# Patient Record
Sex: MALE | Race: BLACK OR AFRICAN AMERICAN | NOT HISPANIC OR LATINO | Employment: OTHER | URBAN - METROPOLITAN AREA
[De-identification: names, ages, dates, MRNs, and addresses within clinical notes are randomized per-mention and may not be internally consistent; named-entity substitution may affect disease eponyms.]

---

## 2023-09-19 PROBLEM — F31.9 BIPOLAR DISORDER (MULTI): Status: ACTIVE | Noted: 2023-09-19

## 2023-09-19 PROBLEM — L81.9 PIGMENTED SKIN LESION: Status: ACTIVE | Noted: 2023-09-19

## 2023-09-19 PROBLEM — E78.5 HYPERLIPIDEMIA: Status: ACTIVE | Noted: 2023-09-19

## 2023-09-19 PROBLEM — I42.9 CARDIOMYOPATHY (MULTI): Status: ACTIVE | Noted: 2023-09-19

## 2023-09-19 PROBLEM — E11.9 DIABETES MELLITUS WITHOUT COMPLICATION (MULTI): Status: ACTIVE | Noted: 2023-09-19

## 2023-09-19 PROBLEM — K56.3 GALLSTONE ILEUS (MULTI): Status: ACTIVE | Noted: 2023-09-19

## 2023-09-19 PROBLEM — I83.019: Status: ACTIVE | Noted: 2023-09-19

## 2023-09-19 PROBLEM — L97.509 ULCER OF FOOT DUE TO TYPE 2 DIABETES MELLITUS (MULTI): Status: ACTIVE | Noted: 2023-09-19

## 2023-09-19 PROBLEM — E04.1 THYROID NODULE: Status: ACTIVE | Noted: 2023-09-19

## 2023-09-19 PROBLEM — R06.2 WHEEZING: Status: ACTIVE | Noted: 2023-09-19

## 2023-09-19 PROBLEM — E11.621 ULCER OF FOOT DUE TO TYPE 2 DIABETES MELLITUS (MULTI): Status: ACTIVE | Noted: 2023-09-19

## 2023-09-19 PROBLEM — L98.499 TYPE 2 DIABETES MELLITUS WITH OTHER SKIN ULCER (MULTI): Status: ACTIVE | Noted: 2023-09-19

## 2023-09-19 PROBLEM — E87.5 HYPERKALEMIA: Status: ACTIVE | Noted: 2023-09-19

## 2023-09-19 PROBLEM — I50.9 CONGESTIVE HEART FAILURE (MULTI): Status: ACTIVE | Noted: 2023-09-19

## 2023-09-19 PROBLEM — N18.30 STAGE 3 CHRONIC KIDNEY DISEASE (MULTI): Status: ACTIVE | Noted: 2023-09-19

## 2023-09-19 PROBLEM — R06.9 ABNORMAL BREATHING: Status: ACTIVE | Noted: 2023-09-19

## 2023-09-19 PROBLEM — E66.01 MORBID OBESITY (MULTI): Status: ACTIVE | Noted: 2023-09-19

## 2023-09-19 PROBLEM — K21.9 GASTROESOPHAGEAL REFLUX DISEASE WITHOUT ESOPHAGITIS: Status: ACTIVE | Noted: 2023-09-19

## 2023-09-19 PROBLEM — N52.9 IMPOTENCE OF ORGANIC ORIGIN: Status: ACTIVE | Noted: 2023-09-19

## 2023-09-19 PROBLEM — J96.11 CHRONIC RESPIRATORY FAILURE WITH HYPOXIA (MULTI): Status: ACTIVE | Noted: 2023-09-19

## 2023-09-19 PROBLEM — H90.6 MIXED CONDUCTIVE AND SENSORINEURAL HEARING LOSS OF BOTH EARS: Status: ACTIVE | Noted: 2023-09-19

## 2023-09-19 PROBLEM — I10 ESSENTIAL HYPERTENSION: Status: ACTIVE | Noted: 2023-09-19

## 2023-09-19 PROBLEM — I50.42 CHRONIC COMBINED SYSTOLIC AND DIASTOLIC HEART FAILURE (MULTI): Status: ACTIVE | Noted: 2023-09-19

## 2023-09-19 PROBLEM — I87.2 VENOUS INSUFFICIENCY: Status: ACTIVE | Noted: 2023-09-19

## 2023-09-19 PROBLEM — B02.29 POSTHERPETIC NEURALGIA: Status: ACTIVE | Noted: 2023-09-19

## 2023-09-19 PROBLEM — L97.909 CHRONIC ULCER OF LOWER EXTREMITY (MULTI): Status: ACTIVE | Noted: 2023-09-19

## 2023-09-19 PROBLEM — M10.9 GOUT: Status: ACTIVE | Noted: 2023-09-19

## 2023-09-19 PROBLEM — E11.622 TYPE 2 DIABETES MELLITUS WITH OTHER SKIN ULCER (MULTI): Status: ACTIVE | Noted: 2023-09-19

## 2023-09-19 PROBLEM — D50.0 ANEMIA DUE TO BLOOD LOSS: Status: ACTIVE | Noted: 2023-09-19

## 2023-09-19 PROBLEM — L97.911 ULCER OF RIGHT LOWER EXTREMITY, LIMITED TO BREAKDOWN OF SKIN (MULTI): Status: ACTIVE | Noted: 2023-09-19

## 2023-09-19 PROBLEM — L97.919: Status: ACTIVE | Noted: 2023-09-19

## 2023-09-19 PROBLEM — F20.9 SCHIZOPHRENIA (MULTI): Status: ACTIVE | Noted: 2023-09-19

## 2023-09-19 PROBLEM — N40.0 ENLARGED PROSTATE: Status: ACTIVE | Noted: 2023-09-19

## 2023-09-19 PROBLEM — G47.33 OBSTRUCTIVE SLEEP APNEA SYNDROME: Status: ACTIVE | Noted: 2023-09-19

## 2023-09-19 PROBLEM — E87.8 FLUID VOLUME DISORDER: Status: ACTIVE | Noted: 2023-09-19

## 2023-09-19 PROBLEM — J98.8 CONGESTION OF RESPIRATORY TRACT: Status: ACTIVE | Noted: 2023-09-19

## 2023-09-19 RX ORDER — GABAPENTIN 300 MG/1
300 CAPSULE ORAL NIGHTLY
COMMUNITY

## 2023-09-19 RX ORDER — WARFARIN 1 MG/1
TABLET ORAL
COMMUNITY

## 2023-09-19 RX ORDER — FUROSEMIDE 80 MG/1
80 TABLET ORAL 2 TIMES DAILY
COMMUNITY
End: 2024-02-29 | Stop reason: ALTCHOICE

## 2023-09-19 RX ORDER — SPIRONOLACTONE 25 MG/1
TABLET ORAL
COMMUNITY

## 2023-09-19 RX ORDER — CARVEDILOL 3.12 MG/1
TABLET ORAL
COMMUNITY
End: 2024-02-29 | Stop reason: ALTCHOICE

## 2023-09-19 RX ORDER — PRAVASTATIN SODIUM 80 MG/1
TABLET ORAL
COMMUNITY

## 2023-09-19 RX ORDER — ALLOPURINOL 100 MG/1
TABLET ORAL
COMMUNITY
Start: 2017-12-07

## 2023-09-19 RX ORDER — TORSEMIDE 20 MG/1
TABLET ORAL
COMMUNITY

## 2023-09-19 RX ORDER — TOPIRAMATE 25 MG/1
TABLET ORAL
COMMUNITY

## 2023-09-19 RX ORDER — AMPICILLIN AND SULBACTAM 2; 1 G/1; G/1
INJECTION, POWDER, FOR SOLUTION INTRAMUSCULAR; INTRAVENOUS
COMMUNITY
End: 2024-02-29 | Stop reason: ALTCHOICE

## 2023-09-19 RX ORDER — NAPROXEN SODIUM 220 MG/1
TABLET, FILM COATED ORAL
COMMUNITY

## 2023-09-19 RX ORDER — PANTOPRAZOLE SODIUM 20 MG/1
TABLET, DELAYED RELEASE ORAL
COMMUNITY
End: 2024-02-29 | Stop reason: ALTCHOICE

## 2023-09-19 RX ORDER — WARFARIN SODIUM 5 MG/1
TABLET ORAL
COMMUNITY

## 2023-09-19 RX ORDER — FOLIC ACID 1 MG/1
TABLET ORAL
COMMUNITY
End: 2024-01-11

## 2023-09-19 RX ORDER — COLCHICINE 0.6 MG/1
TABLET ORAL
COMMUNITY
Start: 2011-09-07

## 2023-09-19 RX ORDER — PANTOPRAZOLE SODIUM 40 MG/1
40 TABLET, DELAYED RELEASE ORAL
COMMUNITY
End: 2024-04-12 | Stop reason: SDUPTHER

## 2023-09-19 RX ORDER — PRAVASTATIN SODIUM 20 MG/1
TABLET ORAL
COMMUNITY
End: 2024-02-29 | Stop reason: ALTCHOICE

## 2023-09-19 RX ORDER — FINASTERIDE 5 MG/1
TABLET, FILM COATED ORAL
COMMUNITY
End: 2024-02-29 | Stop reason: ALTCHOICE

## 2023-09-19 RX ORDER — GUAIFENESIN 600 MG/1
TABLET, EXTENDED RELEASE ORAL
COMMUNITY
Start: 2017-12-04 | End: 2024-02-29 | Stop reason: ALTCHOICE

## 2023-09-19 RX ORDER — OXYCODONE AND ACETAMINOPHEN 5; 325 MG/1; MG/1
TABLET ORAL
COMMUNITY
Start: 2022-10-14 | End: 2024-02-29 | Stop reason: ALTCHOICE

## 2023-09-19 RX ORDER — ENALAPRIL MALEATE 2.5 MG/1
TABLET ORAL
COMMUNITY
End: 2024-02-29 | Stop reason: ALTCHOICE

## 2023-09-19 RX ORDER — HYDRALAZINE HYDROCHLORIDE 50 MG/1
1 TABLET, FILM COATED ORAL 3 TIMES DAILY
COMMUNITY

## 2023-10-15 DIAGNOSIS — R73.9 HYPERGLYCEMIA, UNSPECIFIED: ICD-10-CM

## 2023-11-07 RX ORDER — FOLIC ACID 1 MG/1
1 TABLET ORAL DAILY
Qty: 90 TABLET | Refills: 3 | OUTPATIENT
Start: 2023-11-07

## 2023-12-15 ENCOUNTER — TELEPHONE (OUTPATIENT)
Dept: PHARMACY | Facility: HOSPITAL | Age: 63
End: 2023-12-15

## 2023-12-15 NOTE — TELEPHONE ENCOUNTER
Population Health: Outreach by Ambulatory Pharmacy Team    Payor: Solomon FRANKLIN  Reason: Adherence  Medication: Mounjaro 2.5 mg/0.5 mL pen injection, pravastatin 80 mg  Outcome: No Answer/Invalid Number Unable to leave VM (mailbox full).   Additional Notes:   -Note for bariatric surgery, [facility] 11/17/2023- indicated potential switch to Ozempic (cost issue with Mounjaro)   -No indication that pravastatin was discontinued. Patient due to have refilled.     Lashonda ShannonD, [facility] PGY1 Pharmacy Resident  [facility]

## 2024-02-29 ENCOUNTER — OFFICE VISIT (OUTPATIENT)
Dept: PRIMARY CARE | Facility: CLINIC | Age: 64
End: 2024-02-29
Payer: MEDICARE

## 2024-02-29 VITALS
WEIGHT: 273 LBS | RESPIRATION RATE: 16 BRPM | HEIGHT: 70 IN | BODY MASS INDEX: 39.08 KG/M2 | HEART RATE: 80 BPM | OXYGEN SATURATION: 96 %

## 2024-02-29 DIAGNOSIS — M10.9 GOUTY ARTHROPATHY: ICD-10-CM

## 2024-02-29 DIAGNOSIS — E11.9 DIABETES MELLITUS WITHOUT COMPLICATION (MULTI): ICD-10-CM

## 2024-02-29 DIAGNOSIS — Z00.00 ROUTINE GENERAL MEDICAL EXAMINATION AT A HEALTH CARE FACILITY: Primary | ICD-10-CM

## 2024-02-29 DIAGNOSIS — G47.33 OBSTRUCTIVE SLEEP APNEA SYNDROME: ICD-10-CM

## 2024-02-29 DIAGNOSIS — K21.9 GASTROESOPHAGEAL REFLUX DISEASE WITHOUT ESOPHAGITIS: ICD-10-CM

## 2024-02-29 DIAGNOSIS — M10.9 ACUTE GOUT, UNSPECIFIED CAUSE, UNSPECIFIED SITE: ICD-10-CM

## 2024-02-29 DIAGNOSIS — I50.42 CHRONIC COMBINED SYSTOLIC AND DIASTOLIC HEART FAILURE (MULTI): ICD-10-CM

## 2024-02-29 PROCEDURE — 99214 OFFICE O/P EST MOD 30 MIN: CPT | Performed by: INTERNAL MEDICINE

## 2024-02-29 PROCEDURE — G0439 PPPS, SUBSEQ VISIT: HCPCS | Performed by: INTERNAL MEDICINE

## 2024-02-29 PROCEDURE — 1036F TOBACCO NON-USER: CPT | Performed by: INTERNAL MEDICINE

## 2024-02-29 PROCEDURE — 99215 OFFICE O/P EST HI 40 MIN: CPT | Performed by: INTERNAL MEDICINE

## 2024-02-29 RX ORDER — SUCRALFATE 1 G/1
1 TABLET ORAL
COMMUNITY
Start: 2023-06-09

## 2024-02-29 RX ORDER — ASCORBIC ACID 500 MG
1 TABLET ORAL 2 TIMES DAILY
COMMUNITY
Start: 2018-05-14

## 2024-02-29 RX ORDER — POLYETHYLENE GLYCOL 3350 17 G/17G
1 POWDER, FOR SOLUTION ORAL DAILY
COMMUNITY
Start: 2018-05-29

## 2024-02-29 RX ORDER — PANTOPRAZOLE SODIUM 40 MG/1
40 TABLET, DELAYED RELEASE ORAL
Qty: 180 TABLET | Refills: 3 | Status: CANCELLED | OUTPATIENT
Start: 2024-02-29 | End: 2025-02-28

## 2024-02-29 RX ORDER — DOXYCYCLINE 100 MG/1
100 CAPSULE ORAL 2 TIMES DAILY
Qty: 180 CAPSULE | Refills: 3 | Status: SHIPPED | OUTPATIENT
Start: 2024-02-29 | End: 2024-04-15 | Stop reason: ALTCHOICE

## 2024-02-29 RX ORDER — POTASSIUM CHLORIDE 20 MEQ/1
1 TABLET, EXTENDED RELEASE ORAL DAILY
COMMUNITY
Start: 2022-08-26 | End: 2024-04-17 | Stop reason: SDUPTHER

## 2024-02-29 RX ORDER — PANTOPRAZOLE SODIUM 40 MG/1
40 TABLET, DELAYED RELEASE ORAL DAILY
Qty: 90 TABLET | Refills: 3 | Status: SHIPPED | OUTPATIENT
Start: 2024-02-29 | End: 2024-04-15 | Stop reason: WASHOUT

## 2024-02-29 RX ORDER — VIT C/E/ZN/COPPR/LUTEIN/ZEAXAN 250MG-90MG
2 CAPSULE ORAL DAILY
COMMUNITY
Start: 2018-09-04

## 2024-02-29 RX ORDER — COLCHICINE 0.6 MG/1
0.6 TABLET ORAL 2 TIMES DAILY
Qty: 10 TABLET | Refills: 3 | Status: SHIPPED | OUTPATIENT
Start: 2024-02-29 | End: 2024-03-05

## 2024-02-29 RX ORDER — FERROUS SULFATE 325(65) MG
1 TABLET ORAL
COMMUNITY
Start: 2023-01-05 | End: 2024-05-07 | Stop reason: SDUPTHER

## 2024-02-29 RX ORDER — DOXYCYCLINE 100 MG/1
1 CAPSULE ORAL 2 TIMES DAILY
COMMUNITY
Start: 2024-02-14 | End: 2024-02-29 | Stop reason: SDUPTHER

## 2024-02-29 RX ORDER — COLCHICINE 0.6 MG/1
TABLET ORAL
Qty: 90 TABLET | Refills: 0 | Status: CANCELLED | OUTPATIENT
Start: 2024-02-29

## 2024-02-29 ASSESSMENT — PROMIS GLOBAL HEALTH SCALE
RATE_SOCIAL_SATISFACTION: FAIR
CARRYOUT_PHYSICAL_ACTIVITIES: A LITTLE
RATE_PHYSICAL_HEALTH: FAIR
CARRYOUT_SOCIAL_ACTIVITIES: FAIR
EMOTIONAL_PROBLEMS: SOMETIMES
RATE_GENERAL_HEALTH: POOR
RATE_QUALITY_OF_LIFE: FAIR
RATE_AVERAGE_PAIN: 6
RATE_AVERAGE_FATIGUE: MODERATE
RATE_MENTAL_HEALTH: GOOD

## 2024-02-29 ASSESSMENT — ENCOUNTER SYMPTOMS
HEADACHES: 0
COUGH: 0
ABDOMINAL PAIN: 0
PALPITATIONS: 0
LOSS OF SENSATION IN FEET: 0
CONSTIPATION: 0
FEVER: 0
OCCASIONAL FEELINGS OF UNSTEADINESS: 0
SLEEP DISTURBANCE: 0
FATIGUE: 0
SORE THROAT: 0
JOINT SWELLING: 0
SHORTNESS OF BREATH: 0
NAUSEA: 0
FREQUENCY: 0
APPETITE CHANGE: 0
DIZZINESS: 0
DIFFICULTY URINATING: 0
HEMATURIA: 0
RHINORRHEA: 0
SINUS PAIN: 0
DIARRHEA: 0
VOMITING: 0
DEPRESSION: 0
NERVOUS/ANXIOUS: 0
ARTHRALGIAS: 0
CHILLS: 0
WEAKNESS: 0

## 2024-02-29 ASSESSMENT — PAIN SCALES - GENERAL: PAINLEVEL: 7

## 2024-02-29 ASSESSMENT — PATIENT HEALTH QUESTIONNAIRE - PHQ9
SUM OF ALL RESPONSES TO PHQ9 QUESTIONS 1 AND 2: 0
2. FEELING DOWN, DEPRESSED OR HOPELESS: NOT AT ALL
1. LITTLE INTEREST OR PLEASURE IN DOING THINGS: NOT AT ALL

## 2024-02-29 NOTE — PROGRESS NOTES
"Subjective   Patient ID: Jacob Whipple is a 63 y.o. male who presents for routine medical exam. Patient reports gout attack in his R hand that began about 2 days ago. His hand is swollen and painful. He does not have colchicine and denies recent gout attacks prior to this. He does not drink much water due to his heart and takes torsemide. He was discharged from hospital on 2/2 after he presented for GI bleed. Patient received blood transfusions and IV iron. He was also put back on Coumadin. Has not see GI since his discharge. He now takes iron supplements. Tolerates Ozempic and has lost a lot of weight since his last visit. His blood glucose this morning was 109. Patient has LVAD and was not a candidate for transplant. He changes his dressing daily and follows with LVAD doctor. Uses CPAP nightly. Received both doses of shingrix.    Diagnostics Reviewed: 1/2024 enteroscopy nml.  Labs Reviewed: 2/26/2024 GFR 55, hemoglobin 9.4. 1/22/2024 hemoglobin 5.6. 1/23/2024 iron 27. 9/2023 PSA nml, lipid nml, A1c 5.2         Review of Systems   Constitutional:  Negative for appetite change, chills, fatigue and fever.        Weight loss   HENT:  Negative for ear pain, rhinorrhea, sinus pain and sore throat.    Eyes:  Negative for visual disturbance.   Respiratory:  Negative for cough and shortness of breath.    Cardiovascular:  Negative for chest pain and palpitations.   Gastrointestinal:  Negative for abdominal pain, constipation, diarrhea, nausea and vomiting.   Genitourinary:  Negative for difficulty urinating, frequency and hematuria.   Musculoskeletal:  Negative for arthralgias and joint swelling.        Gout in R hand   Skin:  Negative for rash.   Neurological:  Negative for dizziness, weakness and headaches.   Psychiatric/Behavioral:  Negative for sleep disturbance. The patient is not nervous/anxious.        Objective   Pulse 80   Resp 16   Ht 1.778 m (5' 10\")   Wt 124 kg (273 lb)   SpO2 96%   BMI 39.17 kg/m² "     Physical Exam  HENT:      Right Ear: Tympanic membrane normal.      Left Ear: Tympanic membrane normal.      Mouth/Throat:      Pharynx: Oropharynx is clear. No oropharyngeal exudate.   Eyes:      Conjunctiva/sclera: Conjunctivae normal.      Pupils: Pupils are equal, round, and reactive to light.   Neck:      Thyroid: No thyromegaly.      Vascular: No carotid bruit.   Cardiovascular:      Rate and Rhythm: Regular rhythm.      Heart sounds: Normal heart sounds.   Pulmonary:      Breath sounds: Normal breath sounds.   Abdominal:      Palpations: Abdomen is soft. There is no hepatomegaly.      Tenderness: There is no abdominal tenderness.   Musculoskeletal:      Right hand: Swelling and tenderness present.      Right lower leg: No edema.      Left lower leg: No edema.   Lymphadenopathy:      Cervical: No cervical adenopathy.   Skin:     General: Skin is warm.      Comments: No suspicious moles   Neurological:      Mental Status: He is alert and oriented to person, place, and time.      Gait: Gait is intact.   Psychiatric:         Mood and Affect: Mood and affect normal.         Behavior: Behavior normal. Behavior is cooperative.         Cognition and Memory: Cognition normal.         Assessment/Plan   Diagnoses and all orders for this visit:  Routine general medical examination at a health care facility  Acute gout, unspecified cause, unspecified site  -     doxycycline (Vibramycin) 100 mg capsule; Take 1 capsule (100 mg) by mouth 2 times a day.  -     semaglutide (OZEMPIC) 1 mg/dose (4 mg/3 mL) pen injector; Inject 1 mg under the skin 1 (one) time per week.  -     Hemoglobin A1C; Future  -     Uric Acid; Future  -     colchicine 0.6 mg tablet; Take 1 tablet (0.6 mg) by mouth 2 times a day for 5 days.  -     pantoprazole (ProtoNix) 40 mg EC tablet; Take 1 tablet (40 mg) by mouth once daily. Do not crush, chew, or split.  Gastroesophageal reflux disease without esophagitis  -     doxycycline (Vibramycin) 100 mg  capsule; Take 1 capsule (100 mg) by mouth 2 times a day.  -     semaglutide (OZEMPIC) 1 mg/dose (4 mg/3 mL) pen injector; Inject 1 mg under the skin 1 (one) time per week.  -     Hemoglobin A1C; Future  -     Uric Acid; Future  -     colchicine 0.6 mg tablet; Take 1 tablet (0.6 mg) by mouth 2 times a day for 5 days.  -     pantoprazole (ProtoNix) 40 mg EC tablet; Take 1 tablet (40 mg) by mouth once daily. Do not crush, chew, or split.  Diabetes mellitus without complication (CMS/HCC)  -     doxycycline (Vibramycin) 100 mg capsule; Take 1 capsule (100 mg) by mouth 2 times a day.  -     semaglutide (OZEMPIC) 1 mg/dose (4 mg/3 mL) pen injector; Inject 1 mg under the skin 1 (one) time per week.  -     Hemoglobin A1C; Future  -     Uric Acid; Future  -     colchicine 0.6 mg tablet; Take 1 tablet (0.6 mg) by mouth 2 times a day for 5 days.  -     pantoprazole (ProtoNix) 40 mg EC tablet; Take 1 tablet (40 mg) by mouth once daily. Do not crush, chew, or split.  Chronic combined systolic and diastolic heart failure (CMS/HCC)  -     doxycycline (Vibramycin) 100 mg capsule; Take 1 capsule (100 mg) by mouth 2 times a day.  -     semaglutide (OZEMPIC) 1 mg/dose (4 mg/3 mL) pen injector; Inject 1 mg under the skin 1 (one) time per week.  -     Hemoglobin A1C; Future  -     Uric Acid; Future  -     colchicine 0.6 mg tablet; Take 1 tablet (0.6 mg) by mouth 2 times a day for 5 days.  -     pantoprazole (ProtoNix) 40 mg EC tablet; Take 1 tablet (40 mg) by mouth once daily. Do not crush, chew, or split.  Obstructive sleep apnea syndrome  -     doxycycline (Vibramycin) 100 mg capsule; Take 1 capsule (100 mg) by mouth 2 times a day.  -     semaglutide (OZEMPIC) 1 mg/dose (4 mg/3 mL) pen injector; Inject 1 mg under the skin 1 (one) time per week.  -     Hemoglobin A1C; Future  -     Uric Acid; Future  -     colchicine 0.6 mg tablet; Take 1 tablet (0.6 mg) by mouth 2 times a day for 5 days.  -     pantoprazole (ProtoNix) 40 mg EC tablet;  Take 1 tablet (40 mg) by mouth once daily. Do not crush, chew, or split.  Gouty arthropathy  -     doxycycline (Vibramycin) 100 mg capsule; Take 1 capsule (100 mg) by mouth 2 times a day.  -     semaglutide (OZEMPIC) 1 mg/dose (4 mg/3 mL) pen injector; Inject 1 mg under the skin 1 (one) time per week.  -     Hemoglobin A1C; Future  -     Uric Acid; Future  -     colchicine 0.6 mg tablet; Take 1 tablet (0.6 mg) by mouth 2 times a day for 5 days.  -     pantoprazole (ProtoNix) 40 mg EC tablet; Take 1 tablet (40 mg) by mouth once daily. Do not crush, chew, or split.      Scribe Attestation  By signing my name below, I, Radha Mancia, Scrdima   attest that this documentation has been prepared under the direction and in the presence of Radha Vasquez MD.

## 2024-03-02 NOTE — ADDENDUM NOTE
Addended by: CATHY HADDAD on: 3/2/2024 10:42 AM     Modules accepted: Level of Service    
Addended by: CATHY HADDAD on: 3/2/2024 10:45 AM     Modules accepted: Level of Service    
Tracheobronchomalacia

## 2024-04-01 ENCOUNTER — DOCUMENTATION (OUTPATIENT)
Dept: PRIMARY CARE | Facility: CLINIC | Age: 64
End: 2024-04-01
Payer: MEDICARE

## 2024-04-01 ENCOUNTER — PATIENT OUTREACH (OUTPATIENT)
Dept: PRIMARY CARE | Facility: CLINIC | Age: 64
End: 2024-04-01
Payer: MEDICARE

## 2024-04-01 RX ORDER — OXYCODONE HYDROCHLORIDE 5 MG/1
5 TABLET ORAL EVERY 12 HOURS PRN
COMMUNITY
Start: 2024-03-31 | End: 2024-04-12

## 2024-04-01 NOTE — PROGRESS NOTES
Discharge Facility: [facility]  Discharge Diagnosis: Driveline Infection  Admission Date: 3/4/24  Discharge Date: 3/31/24    PCP Appointment Date: none avail. Task to office  Specialist Appointment Date: unknown  Hospital Encounter and Summary: Linked     See discharge assessment below for further details    Engagement  Call Start Time: 1510 (4/1/2024  3:14 PM)    Medications  Medications reviewed with patient/caregiver?: Yes (4/1/2024  3:14 PM)  Is the patient having any side effects they believe may be caused by any medication additions or changes?: No (4/1/2024  3:14 PM)  Does the patient have all medications ordered at discharge?: Yes (4/1/2024  3:14 PM)  Care Management Interventions: No intervention needed (4/1/2024  3:14 PM)  Prescription Comments: pt c/o medications being too expensive. sent a task to pharmacy (4/1/2024  3:14 PM)  Is the patient taking all medications as directed (includes completed medication regime)?: No (4/1/2024  3:14 PM)  What is preventing the patient from taking all medications as directed?: Other (Comment) (pt states medication is too expensive. task to pharmacy) (4/1/2024  3:14 PM)  Care Management Interventions: Provided patient education (4/1/2024  3:14 PM)  Medication Comments: pt c/o medications being too expensive. sent a task to pharmacy (4/1/2024  3:14 PM)    Appointments  Does the patient have a primary care provider?: Yes (no avail appt, task to office) (4/1/2024  3:14 PM)  Has the patient kept scheduled appointments due by today?: Yes (4/1/2024  3:14 PM)    Self Management  What is the home health agency?: [facility] (4/1/2024  3:14 PM)  Has home health visited the patient within 72 hours of discharge?: Yes (4/1/2024  3:14 PM)    Patient Teaching  Does the patient have access to their discharge instructions?: Yes (4/1/2024  3:14 PM)  Care Management Interventions: Reviewed instructions with patient (4/1/2024  3:14 PM)  What is the patient's perception of their health  status since discharge?: Improving (4/1/2024  3:14 PM)  Is the patient/caregiver able to teach back the hierarchy of who to call/visit for symptoms/problems? PCP, Specialist, Home Health nurse, Urgent Care, ED, 911: Yes (4/1/2024  3:14 PM)      Tristen Garg LPN

## 2024-04-12 ENCOUNTER — OFFICE VISIT (OUTPATIENT)
Dept: PRIMARY CARE | Facility: CLINIC | Age: 64
End: 2024-04-12
Payer: MEDICARE

## 2024-04-12 VITALS
OXYGEN SATURATION: 98 % | WEIGHT: 236 LBS | BODY MASS INDEX: 33.79 KG/M2 | HEIGHT: 70 IN | RESPIRATION RATE: 16 BRPM | HEART RATE: 58 BPM

## 2024-04-12 DIAGNOSIS — M10.9 GOUTY ARTHROPATHY: ICD-10-CM

## 2024-04-12 DIAGNOSIS — K21.9 GERD WITHOUT ESOPHAGITIS: Primary | ICD-10-CM

## 2024-04-12 DIAGNOSIS — E11.9 DIABETES MELLITUS WITHOUT COMPLICATION (MULTI): ICD-10-CM

## 2024-04-12 PROCEDURE — 99214 OFFICE O/P EST MOD 30 MIN: CPT | Performed by: INTERNAL MEDICINE

## 2024-04-12 RX ORDER — OXYCODONE AND ACETAMINOPHEN 5; 325 MG/1; MG/1
1 TABLET ORAL EVERY 6 HOURS PRN
Qty: 28 TABLET | Refills: 0 | Status: SHIPPED | OUTPATIENT
Start: 2024-04-12 | End: 2024-04-19

## 2024-04-12 RX ORDER — DAPTOMYCIN IN SODIUM CHLORIDE 700 MG/100ML
700 INJECTION, SOLUTION INTRAVENOUS EVERY 24 HOURS
COMMUNITY
Start: 2024-03-31 | End: 2024-04-17

## 2024-04-12 RX ORDER — PANTOPRAZOLE SODIUM 40 MG/1
40 TABLET, DELAYED RELEASE ORAL 2 TIMES DAILY
Qty: 180 TABLET | Refills: 3 | Status: SHIPPED | OUTPATIENT
Start: 2024-04-12 | End: 2025-04-12

## 2024-04-12 ASSESSMENT — ENCOUNTER SYMPTOMS
OCCASIONAL FEELINGS OF UNSTEADINESS: 0
PALPITATIONS: 0
COUGH: 0
NAUSEA: 0
DIZZINESS: 0
LOSS OF SENSATION IN FEET: 0
DEPRESSION: 0
DIARRHEA: 0
CONSTIPATION: 0
ARTHRALGIAS: 0
ABDOMINAL PAIN: 0
SHORTNESS OF BREATH: 0

## 2024-04-12 ASSESSMENT — PATIENT HEALTH QUESTIONNAIRE - PHQ9
1. LITTLE INTEREST OR PLEASURE IN DOING THINGS: NOT AT ALL
2. FEELING DOWN, DEPRESSED OR HOPELESS: NOT AT ALL
SUM OF ALL RESPONSES TO PHQ9 QUESTIONS 1 AND 2: 0

## 2024-04-12 ASSESSMENT — PAIN SCALES - GENERAL: PAINLEVEL: 6

## 2024-04-12 NOTE — PROGRESS NOTES
"Subjective   Patient ID: Jacob Whipple is a 63 y.o. male who presents for follow up after hospitalization from 3/4-3/31. Patient reported to ED in March for drive line infection. A wound vac was placed and home care comes to his house twice a week to change it. A biopsy of his kidney was taken as well. He also received blood transfusion. Uses CPAP nightly. Reports pain around wound vac and would like some medication. He has gout attack on his R hand. It did not improve with colchicine.     Diagnostics Reviewed: 3/24/2024 CT abdomen showed hemorrhagic ascites, 4.8 cm R renal mass, unchanged.  Labs Reviewed: 4/8/2024 hemoglobin 11.7, creatinine 1.1, INR 2.0. 3/2024 hemoglobin 6.6, A1c 4.2.         Review of Systems   Respiratory:  Negative for cough and shortness of breath.    Cardiovascular:  Negative for palpitations.   Gastrointestinal:  Negative for abdominal pain, constipation, diarrhea and nausea.   Musculoskeletal:  Negative for arthralgias.   Neurological:  Negative for dizziness.       Objective   Pulse 58   Resp 16   Ht 1.778 m (5' 10\")   Wt 107 kg (236 lb)   SpO2 98%   BMI 33.86 kg/m²     Physical Exam  Cardiovascular:      Rate and Rhythm: Normal rate and regular rhythm.      Heart sounds: Normal heart sounds.   Pulmonary:      Breath sounds: Normal breath sounds.   Abdominal:      Comments: Drive line site okay. Wound vac present   Musculoskeletal:      Right lower leg: No edema.      Left lower leg: No edema.   Neurological:      Mental Status: He is oriented to person, place, and time.      Gait: Gait normal.   Psychiatric:         Mood and Affect: Mood normal.         Behavior: Behavior normal.         Assessment/Plan   Diagnoses and all orders for this visit:  GERD without esophagitis  -     pantoprazole (ProtoNix) 40 mg EC tablet; Take 1 tablet (40 mg) by mouth 2 times a day. Do not crush, chew, or split.  Gouty arthropathy  Diabetes mellitus without complication (Multi)      Scribe " Attestation  By signing my name below, I, Anjali Ramos   attest that this documentation has been prepared under the direction and in the presence of Radha Vasquez MD.

## 2024-04-16 ENCOUNTER — PATIENT OUTREACH (OUTPATIENT)
Dept: PRIMARY CARE | Facility: CLINIC | Age: 64
End: 2024-04-16
Payer: MEDICARE

## 2024-04-16 NOTE — PROGRESS NOTES
Call regarding appt. with PCP on (4/12/24) after hospitalization.  At time of outreach call the patient feels as if their condition has (improved) since last visit.  Reviewed the PCP appointment with the pt and addressed any questions or concerns.     Tristen Garg LPN

## 2024-04-17 DIAGNOSIS — E87.5 HYPERKALEMIA: ICD-10-CM

## 2024-04-18 RX ORDER — POTASSIUM CHLORIDE 20 MEQ/1
20 TABLET, EXTENDED RELEASE ORAL DAILY
Qty: 90 TABLET | Refills: 3 | Status: SHIPPED | OUTPATIENT
Start: 2024-04-18

## 2024-04-29 ENCOUNTER — PATIENT OUTREACH (OUTPATIENT)
Dept: PRIMARY CARE | Facility: CLINIC | Age: 64
End: 2024-04-29
Payer: MEDICARE

## 2024-04-29 NOTE — PROGRESS NOTES
Successful outreach to patient regarding hospitalization as patient continues TCM program.   At time of outreach call the patient feels as if their condition has (improved) since initial visit with PCP or specialist.  Questions or concerns addressed at this time with patient.   Provided contact information to patient if any further non-emergent needs arise.      Tristen Garg LPN

## 2024-05-07 DIAGNOSIS — D50.0 ANEMIA DUE TO BLOOD LOSS: ICD-10-CM

## 2024-05-07 RX ORDER — FERROUS SULFATE 325(65) MG
1 TABLET ORAL
Qty: 90 TABLET | Refills: 3 | Status: SHIPPED | OUTPATIENT
Start: 2024-05-07

## 2024-06-28 ENCOUNTER — PATIENT OUTREACH (OUTPATIENT)
Dept: PRIMARY CARE | Facility: CLINIC | Age: 64
End: 2024-06-28
Payer: MEDICARE

## 2024-06-28 NOTE — PROGRESS NOTES
Patient has met target of no readmission for (90) days post (hospital, SNF, rehab) discharge and is graduated from Transitional Care Management program at this time.    Tristen Garg LPN

## 2024-07-16 DIAGNOSIS — M15.9 PRIMARY OSTEOARTHRITIS INVOLVING MULTIPLE JOINTS: ICD-10-CM

## 2024-07-16 RX ORDER — ACETAMINOPHEN AND CODEINE PHOSPHATE 300; 30 MG/1; MG/1
1 TABLET ORAL 2 TIMES DAILY PRN
Qty: 60 TABLET | Refills: 3 | Status: SHIPPED | OUTPATIENT
Start: 2024-07-16

## 2025-01-10 ENCOUNTER — OFFICE VISIT (OUTPATIENT)
Dept: PRIMARY CARE | Facility: CLINIC | Age: 65
End: 2025-01-10
Payer: MEDICARE

## 2025-01-10 VITALS
SYSTOLIC BLOOD PRESSURE: 116 MMHG | RESPIRATION RATE: 16 BRPM | DIASTOLIC BLOOD PRESSURE: 62 MMHG | HEART RATE: 87 BPM | OXYGEN SATURATION: 96 % | WEIGHT: 221 LBS | BODY MASS INDEX: 31.64 KG/M2 | HEIGHT: 70 IN

## 2025-01-10 DIAGNOSIS — I10 ESSENTIAL HYPERTENSION: ICD-10-CM

## 2025-01-10 DIAGNOSIS — Z94.1 HEART TRANSPLANT RECIPIENT: ICD-10-CM

## 2025-01-10 DIAGNOSIS — K21.9 GERD WITHOUT ESOPHAGITIS: ICD-10-CM

## 2025-01-10 DIAGNOSIS — G47.33 OBSTRUCTIVE SLEEP APNEA SYNDROME: ICD-10-CM

## 2025-01-10 DIAGNOSIS — F41.9 ANXIETY: ICD-10-CM

## 2025-01-10 DIAGNOSIS — I50.42 CHRONIC COMBINED SYSTOLIC AND DIASTOLIC HEART FAILURE: ICD-10-CM

## 2025-01-10 DIAGNOSIS — E11.9 DIABETES MELLITUS WITHOUT COMPLICATION (MULTI): Primary | ICD-10-CM

## 2025-01-10 PROCEDURE — 3008F BODY MASS INDEX DOCD: CPT | Performed by: INTERNAL MEDICINE

## 2025-01-10 PROCEDURE — 99214 OFFICE O/P EST MOD 30 MIN: CPT | Performed by: INTERNAL MEDICINE

## 2025-01-10 PROCEDURE — 3074F SYST BP LT 130 MM HG: CPT | Performed by: INTERNAL MEDICINE

## 2025-01-10 PROCEDURE — 3078F DIAST BP <80 MM HG: CPT | Performed by: INTERNAL MEDICINE

## 2025-01-10 PROCEDURE — G2211 COMPLEX E/M VISIT ADD ON: HCPCS | Performed by: INTERNAL MEDICINE

## 2025-01-10 RX ORDER — PREDNISONE 5 MG/1
17.5 TABLET ORAL
COMMUNITY
Start: 2024-12-25

## 2025-01-10 RX ORDER — LANOLIN ALCOHOL/MO/W.PET/CERES
800 CREAM (GRAM) TOPICAL 2 TIMES DAILY
COMMUNITY
Start: 2024-12-24

## 2025-01-10 RX ORDER — AMLODIPINE BESYLATE 5 MG/1
5 TABLET ORAL
COMMUNITY
Start: 2024-12-25

## 2025-01-10 RX ORDER — PANTOPRAZOLE SODIUM 20 MG/1
20 TABLET, DELAYED RELEASE ORAL
Qty: 90 TABLET | Refills: 3 | Status: SHIPPED | OUTPATIENT
Start: 2025-01-10 | End: 2026-01-10

## 2025-01-10 RX ORDER — LORAZEPAM 0.5 MG/1
0.5 TABLET ORAL DAILY PRN
Qty: 30 TABLET | Refills: 0 | Status: SHIPPED | OUTPATIENT
Start: 2025-01-10 | End: 2025-02-09

## 2025-01-10 RX ORDER — SULFAMETHOXAZOLE AND TRIMETHOPRIM 800; 160 MG/1; MG/1
1 TABLET ORAL 2 TIMES DAILY
COMMUNITY
Start: 2024-12-24

## 2025-01-10 RX ORDER — VALGANCICLOVIR 450 MG/1
900 TABLET, FILM COATED ORAL
COMMUNITY
Start: 2025-01-03 | End: 2025-03-04

## 2025-01-10 RX ORDER — MYCOPHENOLATE MOFETIL 250 MG/1
1000 CAPSULE ORAL 2 TIMES DAILY
COMMUNITY
Start: 2024-12-12

## 2025-01-10 RX ORDER — BLOOD-GLUCOSE SENSOR
EACH MISCELLANEOUS
Qty: 9 EACH | Refills: 3 | Status: SHIPPED | OUTPATIENT
Start: 2025-01-10

## 2025-01-10 RX ORDER — INSULIN LISPRO 100 [IU]/ML
10 INJECTION, SOLUTION INTRAVENOUS; SUBCUTANEOUS
COMMUNITY
Start: 2024-12-12

## 2025-01-10 RX ORDER — TACROLIMUS 1 MG/1
2 CAPSULE ORAL 2 TIMES DAILY
COMMUNITY
Start: 2025-01-03

## 2025-01-10 RX ORDER — METOPROLOL TARTRATE 25 MG/1
25 TABLET, FILM COATED ORAL 3 TIMES DAILY
COMMUNITY
Start: 2024-12-24

## 2025-01-10 RX ORDER — TACROLIMUS 0.5 MG/1
0.5 CAPSULE ORAL 2 TIMES DAILY
COMMUNITY
Start: 2025-01-03

## 2025-01-10 RX ORDER — FUROSEMIDE 40 MG/1
40 TABLET ORAL
COMMUNITY
Start: 2025-01-03

## 2025-01-10 RX ORDER — LORAZEPAM 0.5 MG/1
0.5 TABLET ORAL DAILY PRN
COMMUNITY
End: 2025-01-10 | Stop reason: SDUPTHER

## 2025-01-10 RX ORDER — FLUOXETINE HYDROCHLORIDE 20 MG/1
20 CAPSULE ORAL
COMMUNITY
Start: 2024-12-12

## 2025-01-10 ASSESSMENT — ENCOUNTER SYMPTOMS
ARTHRALGIAS: 0
COUGH: 0
SHORTNESS OF BREATH: 0
NAUSEA: 0
CONSTIPATION: 0
DEPRESSION: 0
LOSS OF SENSATION IN FEET: 0
OCCASIONAL FEELINGS OF UNSTEADINESS: 1
DIARRHEA: 0
DIZZINESS: 0
PALPITATIONS: 0
ABDOMINAL PAIN: 0
NERVOUS/ANXIOUS: 1

## 2025-01-10 ASSESSMENT — PAIN SCALES - GENERAL: PAINLEVEL_OUTOF10: 6

## 2025-01-10 NOTE — PROGRESS NOTES
Subjective   Patient ID: Jacob Whipple is a 64 y.o. male who presents for Hospital Discharge Visit.    Patient is doing well overall. Recently, received a heart transplant. Had the assistance device removed. Currently, is wearing a wound cleaning device. Receives home care. Takes a vitamin D, magnesium, calcium and multivitamin supplement. Doesn't want to wear a glucose monitor. Has been getting heart biopsies three times a month. Has a CPAP, uses it every night and tolerates it well. Checks blood pressures and blood sugars everyday. Still has gallbladder with gallstones. Takes pantoprazole every morning but doesn't get heartburn. Gets some exercise via walking around his house. Doesn't see a pulmonologist.     Diagnostics Reviewed: 9/4/2024 CT Chest:Unchanged 4.4 cm higher than simple fluid attenuation right renal cortical mass and Unchanged soft tissue thickening long the LVAD drive line.   Labs Reviewed: 1/6/2025 BMP: Potassium 5.6.          Review of Systems   Respiratory:  Negative for cough and shortness of breath.    Cardiovascular:  Positive for chest pain (around incisions). Negative for palpitations.   Gastrointestinal:  Negative for abdominal pain, constipation, diarrhea and nausea.   Musculoskeletal:  Negative for arthralgias.   Neurological:  Negative for dizziness.   Psychiatric/Behavioral:  The patient is nervous/anxious.      Objective   There were no vitals taken for this visit.    Physical Exam  Cardiovascular:      Rate and Rhythm: Normal rate and regular rhythm.      Heart sounds: Normal heart sounds.   Pulmonary:      Breath sounds: Normal breath sounds.   Abdominal:      Palpations: Abdomen is soft. There is no hepatomegaly.      Tenderness: There is no abdominal tenderness.   Musculoskeletal:      Right lower leg: No edema.      Left lower leg: No edema.   Neurological:      Mental Status: He is alert and oriented to person, place, and time.      Gait: Gait normal.   Psychiatric:         Mood  and Affect: Mood normal.         Behavior: Behavior normal.     Assessment/Plan   There are no diagnoses linked to this encounter.    Scribe Attestation  By signing my name below, I, Anjali Bassett   attest that this documentation has been prepared under the direction and in the presence of Radha Vasquez MD.

## 2025-01-30 DIAGNOSIS — F41.9 ANXIETY: ICD-10-CM

## 2025-02-03 RX ORDER — LORAZEPAM 0.5 MG/1
0.5 TABLET ORAL DAILY PRN
Qty: 3 TABLET | Refills: 0 | Status: SHIPPED | OUTPATIENT
Start: 2025-02-03 | End: 2025-03-05

## 2025-02-11 DIAGNOSIS — E87.8 FLUID VOLUME DISORDER: ICD-10-CM

## 2025-02-11 RX ORDER — FUROSEMIDE 40 MG/1
40 TABLET ORAL
Qty: 90 TABLET | Refills: 3 | Status: SHIPPED | OUTPATIENT
Start: 2025-02-11